# Patient Record
Sex: MALE | Race: WHITE | NOT HISPANIC OR LATINO
[De-identification: names, ages, dates, MRNs, and addresses within clinical notes are randomized per-mention and may not be internally consistent; named-entity substitution may affect disease eponyms.]

---

## 2019-11-25 PROBLEM — Z00.00 ENCOUNTER FOR PREVENTIVE HEALTH EXAMINATION: Status: ACTIVE | Noted: 2019-11-25

## 2019-12-04 ENCOUNTER — APPOINTMENT (OUTPATIENT)
Dept: ORTHOPEDIC SURGERY | Facility: CLINIC | Age: 64
End: 2019-12-04
Payer: MEDICARE

## 2019-12-04 VITALS
HEIGHT: 67 IN | WEIGHT: 230 LBS | HEART RATE: 69 BPM | DIASTOLIC BLOOD PRESSURE: 88 MMHG | BODY MASS INDEX: 36.1 KG/M2 | SYSTOLIC BLOOD PRESSURE: 157 MMHG

## 2019-12-04 DIAGNOSIS — Z86.39 PERSONAL HISTORY OF OTHER ENDOCRINE, NUTRITIONAL AND METABOLIC DISEASE: ICD-10-CM

## 2019-12-04 DIAGNOSIS — M19.042 PRIMARY OSTEOARTHRITIS, LEFT HAND: ICD-10-CM

## 2019-12-04 DIAGNOSIS — M67.442 GANGLION, LEFT HAND: ICD-10-CM

## 2019-12-04 PROCEDURE — 73140 X-RAY EXAM OF FINGER(S): CPT | Mod: F1

## 2019-12-04 PROCEDURE — 99203 OFFICE O/P NEW LOW 30 MIN: CPT

## 2019-12-04 RX ORDER — LOSARTAN POTASSIUM 100 MG/1
TABLET, FILM COATED ORAL
Refills: 0 | Status: ACTIVE | COMMUNITY

## 2019-12-04 RX ORDER — ATORVASTATIN CALCIUM 80 MG/1
TABLET, FILM COATED ORAL
Refills: 0 | Status: ACTIVE | COMMUNITY

## 2019-12-04 RX ORDER — TAMSULOSIN HYDROCHLORIDE 0.4 MG/1
0.4 CAPSULE ORAL
Refills: 0 | Status: ACTIVE | COMMUNITY

## 2019-12-04 RX ORDER — FINASTERIDE 1 MG/1
TABLET ORAL
Refills: 0 | Status: ACTIVE | COMMUNITY

## 2019-12-04 NOTE — HISTORY OF PRESENT ILLNESS
[Left] : left hand dominant [FreeTextEntry1] : He comes in today for evaluation of left index finger mucous cyst for the past three months. He reports that at age 4, he had slammed a door on his left index finger. He is currently also undergoing the wart removal process. He was diagnosed with a mucous cyst by the dermatologist who is treating his wart.  He denies associated pain.\par \par He is accompanied by his wife.

## 2019-12-04 NOTE — PHYSICAL EXAM
[de-identified] : - Constitutional: This is a male in no obvious distress.  He was accompanied by his wife today.\par - Psych: Patient is alert and oriented to person, place and time.  Patient has a normal mood and affect.\par - Cardiovascular: Normal pulses throughout the upper extremities.  No significant varicosities are noted in the upper extremities. \par - Neuro: Strength and sensation are intact throughout the upper extremities.  Patient has normal coordination.\par - Respiratory:  Patient exhibits no evidence of shortness of breath or difficulty breathing.\par - Skin: No rashes, lesions, or other abnormalities are noted in the upper extremities.\par \par ---\par Examination of the left index finger demonstrates a mucous cyst along the radial aspect of the DIP joint.  There is an associated ridge in the nail.  There is also loss of the nail ulnarly.  He has a wart along the radial aspect of the index finger distally, which is being frozen by his dermatologist.  He has some limitation of terminal flexion and extension at the DIP joint. [de-identified] : AP, lateral, and oblique x-rays of the left index finger demonstrate minimal degenerative changes at the DIP joint with a small spur at the ulnar base of the distal phalanx at the DIP joint.

## 2019-12-04 NOTE — DISCUSSION/SUMMARY
[FreeTextEntry1] : He has findings consistent with a left index finger mucous cyst with an associated ridge in the nail.  He also has a wart on that same finger which is being treated by his dermatologist with cryotherapy.\par \par I had a discussion regarding today's visit, the diagnosis, and treatment recommendations / options. We discussed potential treatment options of observation and surgical excision. He understands that if he opts to proceed with observation, there is a chance the nail ridging will worsen and may crack the fingernail. He would like to consider his options and will follow up if there are any noticeable changes in the fingernail to further discuss his treatment options.  He told me that he may consider surgery but would like to think about this.  I told him that there is no emergency.  I would also recommend that he finishes of treatment for the wart before considering surgery.  He understands that if he has increased pain, swelling or redness, then he should call the office.\par \par I did discuss the procedure with him if he decides to proceed.\par \par -  The nature and purposes of the operation/procedure was discussed in detail.  I discussed the surgical procedure in detail, as well as expected postoperative recovery and outcome.\par -  Possible risks, benefits, and complications (from known and unknown causes) of the procedure were discussed in detail.  \par -  Possible non-operative alternatives to the proposed treatment were discussed in detail.  \par -  He was told that possible risks/complications include, but are not limited to:  Infection, digital nerve or vessel injury, stiffness, painful scar, poor outcome, need for additional surgical procedures, and other unforeseen complications.  \par -  In addition, the possibility of an "unsuccessful outcome," despite "successful surgery," was discussed with him.  Specifically I discussed the chance of recurrence, of approximately 5%.  I also told him that the ridge may not resolve after successful excision of the cyst.\par -  The patient fully understands these risks.  Again, he would like to think about this and if he decides to proceed, he will call the office.\par -  I had a lengthy discussion with the patient regarding today's visit, the diagnosis, and my surgical treatment recommendations.  All questions were fully answered to the patient's satisfaction.\par \par Over 50% of the time spent with the patient was on counseling the patient on the above diagnosis, treatment plan and prognosis.

## 2019-12-04 NOTE — ADDENDUM
[FreeTextEntry1] : I, David Morris, acted solely as a scribe for Dr. Raúl Brannon on 12/04/2019 . \par \par All medical record entries made by the Scribe were at my, Dr. Raúl Brannon, direction and personally dictated by me on 12/04/2019. I have personally reviewed the chart and agree that the record accurately reflects my personal performance of the history, physical exam, assessment and plan.

## 2021-02-24 ENCOUNTER — OUTPATIENT (OUTPATIENT)
Dept: OUTPATIENT SERVICES | Facility: HOSPITAL | Age: 66
LOS: 1 days | End: 2021-02-24
Payer: MEDICARE

## 2021-02-24 VITALS
DIASTOLIC BLOOD PRESSURE: 700 MMHG | RESPIRATION RATE: 16 BRPM | WEIGHT: 214.95 LBS | TEMPERATURE: 98 F | SYSTOLIC BLOOD PRESSURE: 120 MMHG | HEART RATE: 78 BPM | OXYGEN SATURATION: 99 % | HEIGHT: 65.5 IN

## 2021-02-24 DIAGNOSIS — K42.9 UMBILICAL HERNIA WITHOUT OBSTRUCTION OR GANGRENE: ICD-10-CM

## 2021-02-24 DIAGNOSIS — Z98.890 OTHER SPECIFIED POSTPROCEDURAL STATES: Chronic | ICD-10-CM

## 2021-02-24 LAB
ANION GAP SERPL CALC-SCNC: 8 MMOL/L — SIGNIFICANT CHANGE UP (ref 7–14)
BUN SERPL-MCNC: 12 MG/DL — SIGNIFICANT CHANGE UP (ref 7–23)
CALCIUM SERPL-MCNC: 9.4 MG/DL — SIGNIFICANT CHANGE UP (ref 8.4–10.5)
CHLORIDE SERPL-SCNC: 102 MMOL/L — SIGNIFICANT CHANGE UP (ref 98–107)
CO2 SERPL-SCNC: 26 MMOL/L — SIGNIFICANT CHANGE UP (ref 22–31)
CREAT SERPL-MCNC: 1.05 MG/DL — SIGNIFICANT CHANGE UP (ref 0.5–1.3)
GLUCOSE SERPL-MCNC: 102 MG/DL — HIGH (ref 70–99)
HCT VFR BLD CALC: 48.2 % — SIGNIFICANT CHANGE UP (ref 39–50)
HGB BLD-MCNC: 15.5 G/DL — SIGNIFICANT CHANGE UP (ref 13–17)
MCHC RBC-ENTMCNC: 29.2 PG — SIGNIFICANT CHANGE UP (ref 27–34)
MCHC RBC-ENTMCNC: 32.2 GM/DL — SIGNIFICANT CHANGE UP (ref 32–36)
MCV RBC AUTO: 90.8 FL — SIGNIFICANT CHANGE UP (ref 80–100)
NRBC # BLD: 0 /100 WBCS — SIGNIFICANT CHANGE UP
NRBC # FLD: 0 K/UL — SIGNIFICANT CHANGE UP
PLATELET # BLD AUTO: 201 K/UL — SIGNIFICANT CHANGE UP (ref 150–400)
POTASSIUM SERPL-MCNC: 4.4 MMOL/L — SIGNIFICANT CHANGE UP (ref 3.5–5.3)
POTASSIUM SERPL-SCNC: 4.4 MMOL/L — SIGNIFICANT CHANGE UP (ref 3.5–5.3)
RBC # BLD: 5.31 M/UL — SIGNIFICANT CHANGE UP (ref 4.2–5.8)
RBC # FLD: 13.2 % — SIGNIFICANT CHANGE UP (ref 10.3–14.5)
SODIUM SERPL-SCNC: 136 MMOL/L — SIGNIFICANT CHANGE UP (ref 135–145)
WBC # BLD: 6.26 K/UL — SIGNIFICANT CHANGE UP (ref 3.8–10.5)
WBC # FLD AUTO: 6.26 K/UL — SIGNIFICANT CHANGE UP (ref 3.8–10.5)

## 2021-02-24 PROCEDURE — 93010 ELECTROCARDIOGRAM REPORT: CPT

## 2021-02-24 NOTE — H&P PST ADULT - GASTROINTESTINAL COMMENTS
Small bulge umbilicus - pt denies pain with pressure Hx of umbilical hernia for last 4 yrs with increased bulge and occasional pain

## 2021-02-24 NOTE — H&P PST ADULT - NSICDXPASTMEDICALHX_GEN_ALL_CORE_FT
PAST MEDICAL HISTORY:  History of BPH     HLD (hyperlipidemia)     HTN (hypertension)     Obesity     Umbilical hernia

## 2021-02-24 NOTE — H&P PST ADULT - HISTORY OF PRESENT ILLNESS
Pt is a 65 yr old male scheduled for Ventral Hernia Repair with Dr Lucas tentatively 3/4/21 - pt c/o of bulge and occasional pain from umbilical hernia noted 4 yrs ago - pt hx HTN, BPH\  Pt denies COVID or recent travel   Pt to have COVID preop  Pt is a 65 yr old male scheduled for Ventral Hernia Repair with Dr Lucas tentatively 3/4/21 - pt c/o of increase size of  bulge and occasional pain from umbilical hernia noted 4 yrs ago - pt hx HTN, BPH -   Pt denies COVID or recent travel   Pt to have COVID preop test

## 2021-02-24 NOTE — H&P PST ADULT - PULMONARY EMBOLUS
Health Maintenance Summary     Topic Due On Due Status Completed On Postpone Until Reason    Osteoporosis Screening Jul 11, 2007 Postponed  Oct 26, 2018 Patient Refused    Diabetes Eye Exam Jul 11, 1960 Postponed  Oct 26, 2018 Patient Refused    Glycohemoglobin A1C  (Diabetes Sugar)  Oct 25, 2018 Due Soon Apr 25, 2018      GFR  (Kidney Function Test)  Aug 19, 2019 Not Due Aug 19, 2018      Diabetes Foot Exam  Oct 11, 2018 Due Soon Oct 11, 2017      Medicare Wellness Visit Dec 18, 2016 Postponed Dec 18, 2015 Oct 26, 2018 Patient Refused    IMMUNIZATION - DTaP/Tdap/Td Jul 11, 1961 Postponed  Oct 26, 2018 Medical Complication    Immunization-Influenza Sep 1, 2018 Due On       Depression Screening Aug 19, 2019 Not Due Aug 19, 2018      Pneumococcal Vaccine 65+ High/Highest Risk Jul 11, 2007 Postponed  Oct 26, 2018 Patient Refused          Patient is up to date, no discussion needed .      Unaddressed Risk Adjusted HCC Categories and Diagnoses   - Vascular Disease   Unaddressed Dx:Vascular disorder of intestine, unspecified (CMS/HCC)         no

## 2021-02-24 NOTE — H&P PST ADULT - NSICDXPROBLEM_GEN_ALL_CORE_FT
PROBLEM DIAGNOSES  Problem: Umbilical hernia  Assessment and Plan: Pt scheduled for surgery and preop instructions including instructions for taking Famotidine and for Chlorhexidine use in showering on the day of surgery, given verbally and with use of  written materials, and patient confirming understanding of such instructions using  teach back   method.  OR Booking notified of lorenza precautions   Pt to see PCP for [facility] as per surgeon

## 2021-03-01 ENCOUNTER — APPOINTMENT (OUTPATIENT)
Dept: DISASTER EMERGENCY | Facility: CLINIC | Age: 66
End: 2021-03-01

## 2021-09-01 PROBLEM — K42.9 UMBILICAL HERNIA WITHOUT OBSTRUCTION OR GANGRENE: Chronic | Status: ACTIVE | Noted: 2021-02-24

## 2021-09-01 PROBLEM — Z87.438 PERSONAL HISTORY OF OTHER DISEASES OF MALE GENITAL ORGANS: Chronic | Status: ACTIVE | Noted: 2021-02-24

## 2021-09-01 PROBLEM — I10 ESSENTIAL (PRIMARY) HYPERTENSION: Chronic | Status: ACTIVE | Noted: 2021-02-24

## 2021-09-01 PROBLEM — E78.5 HYPERLIPIDEMIA, UNSPECIFIED: Chronic | Status: ACTIVE | Noted: 2021-02-24

## 2021-09-14 ENCOUNTER — OUTPATIENT (OUTPATIENT)
Dept: OUTPATIENT SERVICES | Facility: HOSPITAL | Age: 66
LOS: 1 days | End: 2021-09-14
Payer: MEDICARE

## 2021-09-14 VITALS
SYSTOLIC BLOOD PRESSURE: 115 MMHG | DIASTOLIC BLOOD PRESSURE: 70 MMHG | OXYGEN SATURATION: 97 % | TEMPERATURE: 97 F | HEART RATE: 53 BPM | RESPIRATION RATE: 16 BRPM | WEIGHT: 227.08 LBS | HEIGHT: 66 IN

## 2021-09-14 DIAGNOSIS — K60.1 CHRONIC ANAL FISSURE: ICD-10-CM

## 2021-09-14 DIAGNOSIS — Z98.890 OTHER SPECIFIED POSTPROCEDURAL STATES: Chronic | ICD-10-CM

## 2021-09-14 DIAGNOSIS — Z91.89 OTHER SPECIFIED PERSONAL RISK FACTORS, NOT ELSEWHERE CLASSIFIED: ICD-10-CM

## 2021-09-14 DIAGNOSIS — Z01.818 ENCOUNTER FOR OTHER PREPROCEDURAL EXAMINATION: ICD-10-CM

## 2021-09-14 LAB
ALBUMIN SERPL ELPH-MCNC: 4 G/DL — SIGNIFICANT CHANGE UP (ref 3.3–5)
ALP SERPL-CCNC: 72 U/L — SIGNIFICANT CHANGE UP (ref 30–120)
ALT FLD-CCNC: 29 U/L DA — SIGNIFICANT CHANGE UP (ref 10–60)
ANION GAP SERPL CALC-SCNC: 9 MMOL/L — SIGNIFICANT CHANGE UP (ref 5–17)
AST SERPL-CCNC: 20 U/L — SIGNIFICANT CHANGE UP (ref 10–40)
BILIRUB SERPL-MCNC: 1 MG/DL — SIGNIFICANT CHANGE UP (ref 0.2–1.2)
BUN SERPL-MCNC: 21 MG/DL — SIGNIFICANT CHANGE UP (ref 7–23)
CALCIUM SERPL-MCNC: 8.9 MG/DL — SIGNIFICANT CHANGE UP (ref 8.4–10.5)
CHLORIDE SERPL-SCNC: 103 MMOL/L — SIGNIFICANT CHANGE UP (ref 96–108)
CO2 SERPL-SCNC: 28 MMOL/L — SIGNIFICANT CHANGE UP (ref 22–31)
CREAT SERPL-MCNC: 1.12 MG/DL — SIGNIFICANT CHANGE UP (ref 0.5–1.3)
GLUCOSE SERPL-MCNC: 108 MG/DL — HIGH (ref 70–99)
HCT VFR BLD CALC: 46.6 % — SIGNIFICANT CHANGE UP (ref 39–50)
HGB BLD-MCNC: 16.1 G/DL — SIGNIFICANT CHANGE UP (ref 13–17)
MCHC RBC-ENTMCNC: 30.5 PG — SIGNIFICANT CHANGE UP (ref 27–34)
MCHC RBC-ENTMCNC: 34.5 GM/DL — SIGNIFICANT CHANGE UP (ref 32–36)
MCV RBC AUTO: 88.3 FL — SIGNIFICANT CHANGE UP (ref 80–100)
NRBC # BLD: 0 /100 WBCS — SIGNIFICANT CHANGE UP (ref 0–0)
PLATELET # BLD AUTO: 201 K/UL — SIGNIFICANT CHANGE UP (ref 150–400)
POTASSIUM SERPL-MCNC: 4.3 MMOL/L — SIGNIFICANT CHANGE UP (ref 3.5–5.3)
POTASSIUM SERPL-SCNC: 4.3 MMOL/L — SIGNIFICANT CHANGE UP (ref 3.5–5.3)
PROT SERPL-MCNC: 7.6 G/DL — SIGNIFICANT CHANGE UP (ref 6–8.3)
RBC # BLD: 5.28 M/UL — SIGNIFICANT CHANGE UP (ref 4.2–5.8)
RBC # FLD: 12.7 % — SIGNIFICANT CHANGE UP (ref 10.3–14.5)
SODIUM SERPL-SCNC: 140 MMOL/L — SIGNIFICANT CHANGE UP (ref 135–145)
WBC # BLD: 5.56 K/UL — SIGNIFICANT CHANGE UP (ref 3.8–10.5)
WBC # FLD AUTO: 5.56 K/UL — SIGNIFICANT CHANGE UP (ref 3.8–10.5)

## 2021-09-14 PROCEDURE — G0463: CPT

## 2021-09-14 PROCEDURE — 80053 COMPREHEN METABOLIC PANEL: CPT

## 2021-09-14 PROCEDURE — 85027 COMPLETE CBC AUTOMATED: CPT

## 2021-09-14 PROCEDURE — 36415 COLL VENOUS BLD VENIPUNCTURE: CPT

## 2021-09-14 PROCEDURE — 93005 ELECTROCARDIOGRAM TRACING: CPT

## 2021-09-14 PROCEDURE — 93010 ELECTROCARDIOGRAM REPORT: CPT

## 2021-09-14 RX ORDER — ATORVASTATIN CALCIUM 80 MG/1
1 TABLET, FILM COATED ORAL
Qty: 0 | Refills: 0 | DISCHARGE

## 2021-09-14 RX ORDER — TAMSULOSIN HYDROCHLORIDE 0.4 MG/1
1 CAPSULE ORAL
Qty: 0 | Refills: 0 | DISCHARGE

## 2021-09-14 RX ORDER — FINASTERIDE 5 MG/1
1 TABLET, FILM COATED ORAL
Qty: 0 | Refills: 0 | DISCHARGE

## 2021-09-14 NOTE — H&P PST ADULT - NSICDXPASTMEDICALHX_GEN_ALL_CORE_FT
PAST MEDICAL HISTORY:  Class 2 obesity with body mass index (BMI) of 36.0 to 36.9 in adult     COVID-19 vaccine series completed Pfizer completed on 2/3/21    History of BPH     HLD (hyperlipidemia)     HTN (hypertension)     Umbilical hernia     Vitamin D deficiency

## 2021-09-14 NOTE — H&P PST ADULT - NSICDXPASTSURGICALHX_GEN_ALL_CORE_FT
PAST SURGICAL HISTORY:  H/O colonoscopy     H/O umbilical hernia repair 03/11/21    Status post tendon repair BLE- Quad tendon repair 2017

## 2021-09-14 NOTE — H&P PST ADULT - HISTORY OF PRESENT ILLNESS
66yo male patient with over 10yr history of rectal fissure, and he states that he is having painful bowel movements and then has pain for 12-24hrs afterward. He states that this was always the case, but that it has become more acute over the past year. He rates the pain at 9/10. He is not taking anything for pain

## 2021-09-14 NOTE — H&P PST ADULT - ASSESSMENT
64yo male patient scheduled for surgery on 09/23/21. He will obtain Medical/Cardiac Clearance from Dr. Oshea. He will be NPO as per Anesthesia and will take no meds on AM of surgery. He will hold Advil for 7 days pre-op. All instructions reviewed with patient. As per protocol, he will be screened for Covid19 72hrs pre-op.

## 2021-09-14 NOTE — H&P PST ADULT - NSICDXFAMILYHX_GEN_ALL_CORE_FT
FAMILY HISTORY:  Sibling  Still living? No  Family history of myelofibrosis, Age at diagnosis: Age Unknown

## 2021-09-16 DIAGNOSIS — Z01.818 ENCOUNTER FOR OTHER PREPROCEDURAL EXAMINATION: ICD-10-CM

## 2021-09-20 ENCOUNTER — APPOINTMENT (OUTPATIENT)
Dept: DISASTER EMERGENCY | Facility: CLINIC | Age: 66
End: 2021-09-20

## 2021-09-21 LAB — SARS-COV-2 N GENE NPH QL NAA+PROBE: NOT DETECTED

## 2021-09-22 ENCOUNTER — TRANSCRIPTION ENCOUNTER (OUTPATIENT)
Age: 66
End: 2021-09-22

## 2021-09-22 NOTE — ASU DISCHARGE PLAN (ADULT/PEDIATRIC) - CARE PROVIDER_API CALL
Natan Lucas)  ColonRectal Surgery; Surgery  [address], [address]  [place] [address]  Phone: [phone]  Fax: [phone]  Follow Up Time: 2 weeks

## 2021-09-22 NOTE — ASU DISCHARGE PLAN (ADULT/PEDIATRIC) - CALL YOUR DOCTOR IF YOU HAVE ANY OF THE FOLLOWING:
Bleeding that does not stop/Swelling that gets worse/Pain not relieved by Medications/Fever greater than (need to indicate Fahrenheit or Celsius)/Wound/Surgical Site with redness, or foul smelling discharge or pus Bleeding that does not stop/Swelling that gets worse/Pain not relieved by Medications/Fever greater than (need to indicate Fahrenheit or Celsius)/Wound/Surgical Site with redness, or foul smelling discharge or pus/Unable to urinate

## 2021-09-22 NOTE — ASU DISCHARGE PLAN (ADULT/PEDIATRIC) - DRIVING
21 y/o  who had a uneventful  on 20 who came in c/o this am c/o heavy bleeding and passing clots since yesterday which worsened last night and . this was preceded by a lot of cramping. In the ER she had 2 big gushes and was tachycardic but after the second big gush, she became hypotensive hence a hemorrhage code was called and I along with the nurse manager ran down to the ER where another line was being started and she was being pumped with fluids with IV pitocin and 12 pints of blood. ordered Hemabate IM  and 1000 mg of cytotec which was placed rectally and this firm up her uterus and slowed down the bleeding. The ultrasound report came back showing a very thickened lining with vascular supply c/w retained products of conception hence the OR was called and she was consented via a  to give consent for a D and C and a possible emergency hysterectomy should the bleeding not be controlled.  She was also given IV gentamycin and clindamycin was ordered. Once her vitals improved after 2 units of blood which was rapidly infused was given, she was brought to the OR.  PMH: none NKDA  PSH: none
no driving for 24hrs, no driving while taking narcotics/No...

## 2021-09-22 NOTE — ASU DISCHARGE PLAN (ADULT/PEDIATRIC) - ASU DC SPECIAL INSTRUCTIONSFT
You just had anorectal surgery. Please follow the instructions below to make your recovery as comfortable as possible.    **REST! Apply ice packs to incision sites 20 mins on, 20 mins off every 4 hours for the first 24 hours.    If taking narcotic pain medications, may take COLACE (OTC stool softener) as directed to prevent constipation. Increase ambulation and utilize incentive spirometer as directed.**    1. Depending on the procedure, you may or may not have pain. Please fill any prescriptions you have been given and take as directed.    You may take plain Tylenol, ibuprofen (Advil, Motrin), or Aleve if you wish. Do not take Ibuprofen or Aleve if you have been given a prescription for ketorolac (Toradol). Do not take Tylenol at the same time as oxycodone/acetaminophen (Percocet) or hydrocodone/acetaminophen (Vicodin). You may alternate doses with Tylenol.    If you take aspirin, warfarin (Coumadin), Plavix or any other blood thinner, ask your surgeon when you should re-start these medications.    2. Keep your stool nice and soft to avoid trauma to healing area with hard stool:    - Metamucil or Konsyl (fiber supplement, available over the counter), 1 tablespoon in 8 oz. of water or juice twice a day  - Colace (stool softener, available over the counter), 100mg three times a day  - If you do not have a bowel movement in two days, take 2 tablespoons of liquid Milk of Magnesia (available over the counter) with each meal until you do, then stop taking the Milk of Magnesia.     3. SITZ BATHS (plain warm water) at least four times a day and after every bowel movement for 15-20min each. Avoid toilet paper after a bowel movement, use soap and water or unscented wipes.    4. You should expect some blood staining of your dressing or on the toilet paper. If bleeding is persistent, but light, apply direct and gentle pressure to the area and lie flat in bed for 10-15 min. If bleeding is persistent and heavy or is associated with clots call the office immediately.    5. If you are unable to urinate in 6-8 hours, sit in a warm water bath for 15-20 min and allow yourself to urinate in the bath. If you are still unable to urinate, call the office.    *Please call the office for an appointment when you get home ([phone]). If you have any questions, problems or concerns, do not hesitate to call the office.

## 2021-09-23 ENCOUNTER — RESULT REVIEW (OUTPATIENT)
Age: 66
End: 2021-09-23

## 2021-09-23 ENCOUNTER — OUTPATIENT (OUTPATIENT)
Dept: OUTPATIENT SERVICES | Facility: HOSPITAL | Age: 66
LOS: 1 days | End: 2021-09-23
Payer: MEDICARE

## 2021-09-23 VITALS
OXYGEN SATURATION: 99 % | WEIGHT: 219.58 LBS | DIASTOLIC BLOOD PRESSURE: 75 MMHG | RESPIRATION RATE: 13 BRPM | HEART RATE: 68 BPM | HEIGHT: 66 IN | SYSTOLIC BLOOD PRESSURE: 129 MMHG | TEMPERATURE: 98 F

## 2021-09-23 VITALS
RESPIRATION RATE: 18 BRPM | HEART RATE: 52 BPM | OXYGEN SATURATION: 97 % | SYSTOLIC BLOOD PRESSURE: 135 MMHG | DIASTOLIC BLOOD PRESSURE: 87 MMHG

## 2021-09-23 DIAGNOSIS — Z98.890 OTHER SPECIFIED POSTPROCEDURAL STATES: Chronic | ICD-10-CM

## 2021-09-23 DIAGNOSIS — K60.1 CHRONIC ANAL FISSURE: ICD-10-CM

## 2021-09-23 PROCEDURE — 88304 TISSUE EXAM BY PATHOLOGIST: CPT | Mod: 26

## 2021-09-23 PROCEDURE — 88304 TISSUE EXAM BY PATHOLOGIST: CPT

## 2021-09-23 PROCEDURE — 46200 REMOVAL OF ANAL FISSURE: CPT

## 2021-09-23 PROCEDURE — C1889: CPT

## 2021-09-23 RX ORDER — OXYCODONE HYDROCHLORIDE 5 MG/1
5 TABLET ORAL ONCE
Refills: 0 | Status: DISCONTINUED | OUTPATIENT
Start: 2021-09-23 | End: 2021-09-24

## 2021-09-23 RX ORDER — KETOROLAC TROMETHAMINE 30 MG/ML
1 SYRINGE (ML) INJECTION
Qty: 9 | Refills: 0
Start: 2021-09-23 | End: 2021-09-25

## 2021-09-23 RX ORDER — IBUPROFEN 200 MG
2 TABLET ORAL
Qty: 0 | Refills: 0 | DISCHARGE

## 2021-09-23 RX ORDER — SODIUM CHLORIDE 9 MG/ML
1000 INJECTION, SOLUTION INTRAVENOUS
Refills: 0 | Status: DISCONTINUED | OUTPATIENT
Start: 2021-09-23 | End: 2021-09-24

## 2021-09-23 RX ORDER — TAMSULOSIN HYDROCHLORIDE 0.4 MG/1
1 CAPSULE ORAL
Qty: 0 | Refills: 0 | DISCHARGE

## 2021-09-23 RX ORDER — ATORVASTATIN CALCIUM 80 MG/1
1 TABLET, FILM COATED ORAL
Qty: 0 | Refills: 0 | DISCHARGE

## 2021-09-23 RX ORDER — HYDROMORPHONE HYDROCHLORIDE 2 MG/ML
0.5 INJECTION INTRAMUSCULAR; INTRAVENOUS; SUBCUTANEOUS
Refills: 0 | Status: DISCONTINUED | OUTPATIENT
Start: 2021-09-23 | End: 2021-09-24

## 2021-09-23 RX ORDER — ONDANSETRON 8 MG/1
4 TABLET, FILM COATED ORAL ONCE
Refills: 0 | Status: DISCONTINUED | OUTPATIENT
Start: 2021-09-23 | End: 2021-09-24

## 2021-09-23 RX ORDER — CHOLECALCIFEROL (VITAMIN D3) 125 MCG
1 CAPSULE ORAL
Qty: 0 | Refills: 0 | DISCHARGE

## 2021-09-23 RX ORDER — FOLIC ACID 0.8 MG
1 TABLET ORAL
Qty: 0 | Refills: 0 | DISCHARGE

## 2021-09-23 RX ORDER — OXYCODONE HYDROCHLORIDE 5 MG/1
1 TABLET ORAL
Qty: 16 | Refills: 0
Start: 2021-09-23 | End: 2021-09-26

## 2021-09-23 RX ORDER — MOXIFLOXACIN HYDROCHLORIDE TABLETS, 400 MG 400 MG/1
1 TABLET, FILM COATED ORAL
Qty: 14 | Refills: 0
Start: 2021-09-23 | End: 2021-09-29

## 2021-09-23 RX ORDER — ACETAMINOPHEN 500 MG
2 TABLET ORAL
Qty: 0 | Refills: 0 | DISCHARGE

## 2021-09-23 RX ORDER — LOSARTAN POTASSIUM 100 MG/1
1 TABLET, FILM COATED ORAL
Qty: 0 | Refills: 0 | DISCHARGE

## 2021-09-23 RX ORDER — FINASTERIDE 5 MG/1
1 TABLET, FILM COATED ORAL
Qty: 0 | Refills: 0 | DISCHARGE

## 2021-09-23 RX ADMIN — SODIUM CHLORIDE 75 MILLILITER(S): 9 INJECTION, SOLUTION INTRAVENOUS at 13:34

## 2021-09-23 NOTE — BRIEF OPERATIVE NOTE - COMMENTS
YAW Batista PA-C provided direct first assist support to the surgeon during this surgical procedure. My involvement included positioning, prepping and draping the patient prior to surgery, ensuring clear visibility and exposure for the surgeon by using instruments such as retractors and suction, closing surgical incisions and dressing wounds. As well as other tasks as directed by the surgeon.

## 2024-06-21 NOTE — ASU PATIENT PROFILE, ADULT - AS SC BRADEN MOBILITY
Case Management Discharge Planning    Admission Date: 6/18/2024  GMLOS: 2.9  ALOS: 3    6-Clicks ADL Score:    6-Clicks Mobility Score:    PT and/or OT Eval ordered: Yes  Post-acute Referrals Ordered: Yes  Post-acute Choice Obtained: No  Has referral(s) been sent to post-acute provider:  Yes    Anticipated Discharge Dispo: Discharge Disposition: Disch to IP rehab facility or distinct part unit (62)    DME Needed: No    Action(s) Taken:   Pt discussed duirng IDT rounds. Per MD, pt is not yet medically clear. Pending MRI of spine and PT/OT evaluation.     CM RN requested DPA send referrals to local SNF's in anticipation of post-acute placement need. Pt has accepting SNFs in the [facility] area at this time. MD placed PMR referral, as requested by pt's daughter Reina.     At this time, anticipated discharge disposition is [facility] v.s. SNF upon medical clearance.     Escalations Completed: None    Medically Clear: No    Next Steps:  ANTOINETTE RN to follow up with medical team to discuss discharge barriers or needs.     Barriers to Discharge: Medical clearance, Pending Placement, and Pending PT Evaluation    Is the patient up for discharge tomorrow: No        (4) no limitation

## 2025-06-06 NOTE — ASU PREOP CHECKLIST - SELECT TESTS ORDERED
